# Patient Record
Sex: FEMALE | Race: WHITE
[De-identification: names, ages, dates, MRNs, and addresses within clinical notes are randomized per-mention and may not be internally consistent; named-entity substitution may affect disease eponyms.]

---

## 2017-01-10 ENCOUNTER — HOSPITAL ENCOUNTER (EMERGENCY)
Dept: HOSPITAL 63 - ER | Age: 57
Discharge: HOME | End: 2017-01-10
Payer: COMMERCIAL

## 2017-01-10 VITALS — SYSTOLIC BLOOD PRESSURE: 111 MMHG | DIASTOLIC BLOOD PRESSURE: 53 MMHG

## 2017-01-10 VITALS — BODY MASS INDEX: 26.66 KG/M2 | HEIGHT: 65 IN | WEIGHT: 160 LBS

## 2017-01-10 DIAGNOSIS — Z88.2: ICD-10-CM

## 2017-01-10 DIAGNOSIS — Z88.8: ICD-10-CM

## 2017-01-10 DIAGNOSIS — Z88.6: ICD-10-CM

## 2017-01-10 DIAGNOSIS — Z88.0: ICD-10-CM

## 2017-01-10 DIAGNOSIS — F17.210: ICD-10-CM

## 2017-01-10 DIAGNOSIS — H66.91: ICD-10-CM

## 2017-01-10 DIAGNOSIS — R05: ICD-10-CM

## 2017-01-10 DIAGNOSIS — J44.1: Primary | ICD-10-CM

## 2017-01-10 DIAGNOSIS — K21.9: ICD-10-CM

## 2017-01-10 PROCEDURE — 99283 EMERGENCY DEPT VISIT LOW MDM: CPT

## 2017-01-10 PROCEDURE — 94640 AIRWAY INHALATION TREATMENT: CPT

## 2017-01-10 NOTE — PHYS DOC
General


Chief Complaint:  cough


Stated Complaint:  COUGH CONGESTION


Time Seen by MD:  22:09


Source:  patient


Exam Limitations:  no limitations


Problems:  





History of Present Illness


Initial Comments


Pt is 56/F to ED c/o cough/congestion and nasal drainage x few days.  Also 

having ear pains no drainage/trauma/hearing change.  No fever/chills/n/v/HA.  

States she can hear wheezing.  No sob or chest pain.


Timing/Duration:  getting worse, other


Severity:  moderate


Modifying Factors:  worse with movement, improves with rest


Associated Symptoms:  cough, malaise, other


Allergies:  


Coded Allergies:  


     Penicillins (Verified  Allergy, Unknown, 7/24/16)


     Sulfa (Sulfonamide Antibiotics) (Verified  Allergy, Unknown, 7/24/16)


     amlodipine (Verified  Allergy, Unknown, 7/24/16)


     aspirin (Verified  Allergy, Unknown, 7/24/16)


     bupropion (Verified  Allergy, Unknown, 7/24/16)


     codeine (Verified  Allergy, Unknown, 7/24/16)


     haloperidol (Verified  Allergy, Unknown, 7/24/16)


     ibuprofen (Verified  Allergy, Unknown, 7/24/16)


     meperidine (Verified  Allergy, Unknown, 7/24/16)


     niacin (Verified  Allergy, Unknown, 7/24/16)





Past Medical History


Medical History:  GERD


Surgical History:  noncontributory





Social History


Smoker:  cigarettes


Alcohol:  none


Drugs:  none





Review of Systems


Constitutional:  denies chills, denies diaphoresis, denies fever,  malaise


EENTM:   see HPI ear paindenies ear discharge


Respiratory:   see HPIdenies shortness of breath


Cardiovascular:  denies chest pain, denies palpitations, denies syncope


Gastrointestinal:  denies diarrhea, denies nausea, denies vomiting


Genitourinary:  denies dysuria, denies frequency, denies hematuria


Musculoskeletal:  denies back pain, denies joint swelling, denies neck pain


Psychiatric/Neurological:  denies headache, denies numbness, denies paresthesia





Physical Exam


General Appearance:  WD/WN, no apparent distress


Eyes:  bilateral eye EOMI, bilateral eye PERRL, bilateral eye normal inspection


Ear, Nose, Throat:  hearing grossly normal, other (R TM erythema/bulge, clear 

nasal/PND)


Neck:  non-tender, supple


Respiratory:  chest non-tender, no respiratory distress, wheezing


Cardiovascular:  normal peripheral pulses, regular rate, rhythm


Gastrointestinal:  non tender, soft


Back:  no CVA tenderness, no vertebral tenderness


Extremities:  non-tender, normal inspection


Neurologic/Psychiatric:  CNs II-XII nml as tested, no motor/sensory deficits, 

alert, normal mood/affect, oriented x 3


Skin:  normal color, warm/dry





Departure


Time of Disposition:  22:13


Disposition:  01 HOME, SELF-CARE


Diagnosis:  COPD exacerbation, R otitis media


Condition:  GOOD


Patient Instructions:  Chronic Obstructive Pulmonary Disease Exacerbation, Easy-

to-Read, Otitis Media, Adult





Additional Instructions:


OTC tylenol as needed.


Use home albuterol MDI, 2 puffs every 4 hours and as needed.


Rx:  prednisone, doxycycline, take with food as directed.


Follow up with your doctor in 1-2 weeks.


Return to ED with new or changing symptoms.








BING SORIANO DO Stef 10, 2017 22:15

## 2017-11-11 ENCOUNTER — HOSPITAL ENCOUNTER (EMERGENCY)
Dept: HOSPITAL 63 - ER | Age: 57
Discharge: HOME | End: 2017-11-11
Payer: SELF-PAY

## 2017-11-11 VITALS — BODY MASS INDEX: 25.71 KG/M2 | WEIGHT: 154.32 LBS | HEIGHT: 65 IN

## 2017-11-11 VITALS — DIASTOLIC BLOOD PRESSURE: 65 MMHG | SYSTOLIC BLOOD PRESSURE: 89 MMHG

## 2017-11-11 DIAGNOSIS — Z88.5: ICD-10-CM

## 2017-11-11 DIAGNOSIS — Z88.1: ICD-10-CM

## 2017-11-11 DIAGNOSIS — Z88.2: ICD-10-CM

## 2017-11-11 DIAGNOSIS — M54.5: ICD-10-CM

## 2017-11-11 DIAGNOSIS — Z88.0: ICD-10-CM

## 2017-11-11 DIAGNOSIS — G89.29: ICD-10-CM

## 2017-11-11 DIAGNOSIS — K21.9: ICD-10-CM

## 2017-11-11 DIAGNOSIS — R07.81: Primary | ICD-10-CM

## 2017-11-11 DIAGNOSIS — M19.90: ICD-10-CM

## 2017-11-11 DIAGNOSIS — Z88.6: ICD-10-CM

## 2017-11-11 DIAGNOSIS — B02.9: ICD-10-CM

## 2017-11-11 DIAGNOSIS — J44.9: ICD-10-CM

## 2017-11-11 DIAGNOSIS — Z88.8: ICD-10-CM

## 2017-11-11 PROCEDURE — 99284 EMERGENCY DEPT VISIT MOD MDM: CPT

## 2017-11-11 PROCEDURE — 96372 THER/PROPH/DIAG INJ SC/IM: CPT

## 2017-11-11 PROCEDURE — 71101 X-RAY EXAM UNILAT RIBS/CHEST: CPT

## 2017-11-11 NOTE — PHYS DOC
Past History


Past Medical History:  Arthritis, COPD, GERD, Pneumonia, Other


Past Surgical History:  Cholecystectomy, Tonsillectomy


Alcohol Use:  None


Drug Use:  None





Adult General


Chief Complaint


Chief Complaint:  RIB PAIN





HPI


HPI





She is a pleasant 57-year-old female with a history of chronic back pain, 

chronic scleroderma, COPD, hypertension, peripheral neuropathy who presents 

with rib pain that began 2 days ago after a question will twisting injury. 

Patient is noted pain is worse with range of motion breathing movements 

specifically over that spot of her lower ribs on the right. She has some 

localized redness as well with no clear rash over the area the pain does wrap 

around to the lateral side. She denies any fevers, chills, nausea, vomiting, 

diarrhea, shortness of breath, chest pain only this rib pain is worsened with 

range of motion and direct pressure. She denies any direct trauma or falls. She 

denies any night sweats, weight loss or other symptoms. Patient does still 

smoke pack a day she is on a pain contract with her local physician for chronic 

narcotic use. Took her hydrocodone 7. 5/3/25 today which is not helping her 

symptoms. Because she is under pain contract she was she cannot be prescribed 

any narcotics from this facility she just was just wanting to ensure that she 

has no rib fracture.





Review of Systems


Review of Systems





Constitutional: Denies fever or chills []


Eyes: Denies change in visual acuity, redness, or eye pain []


HENT: Denies nasal congestion or sore throat []


Respiratory: Denies cough or shortness of breath []


Cardiovascular: No additional information not addressed in HPI []


GI: She complains of right lower rib pain without nausea vomiting diarrhea or 

constipation


: Denies dysuria or hematuria []


Musculoskeletal: Does have chronic lower back pain or joint pain nothing new 

today[]


Integument: Denies rash or skin lesions she admits to some slight redness to 

her abdominal wall over the pain she is describing


Neurologic: Denies headache, focal weakness or sensory changes []


Endocrine: Denies polyuria or polydipsia []





All other systems were reviewed and found to be within normal limits, except as 

documented in this note.





Current Medications


Current Medications





Current Medications








 Medications


  (Trade)  Dose


 Ordered  Sig/Brittany  Start Time


 Stop Time Status Last Admin


Dose Admin


 


 Hydromorphone HCl


  (Dilaudid)  1 mg  STK-MED ONCE  11/11/17 19:16


 11/11/17 19:17 DC  


 











Allergies


Allergies





Allergies








Coded Allergies Type Severity Reaction Last Updated Verified


 


  Penicillins Allergy Unknown  7/24/16 Yes


 


  Sulfa (Sulfonamide Antibiotics) Allergy Unknown  7/24/16 Yes


 


  amlodipine Allergy Unknown  7/24/16 Yes


 


  aspirin Allergy Unknown  7/24/16 Yes


 


  bupropion Allergy Unknown  7/24/16 Yes


 


  codeine Allergy Unknown  7/24/16 Yes


 


  haloperidol Allergy Unknown  7/24/16 Yes


 


  ibuprofen Allergy Unknown  7/24/16 Yes


 


  meperidine Allergy Unknown  7/24/16 Yes


 


  niacin Allergy Unknown  7/24/16 Yes











Physical Exam


Physical Exam


Patient is mildly tachycardic upon arrival likely secondary to pain


Constitutional: As patient is mildly obese significant debilitated secondary to 

medical problems she is splinting secondary to the pain over her right ribs. 

She is no acute distress no respiratory distress


HENT: Normocephalic, atraumatic, bilateral external ears normal, dry mucous 

membranes with poor dentition, no oral exudates, nose normal. []


Eyes: PERRLA, EOMI, conjunctiva normal, no discharge. [] 


Neck: Normal range of motion, no tenderness, supple, no stridor. [] 


Cardiovascular:Heart rate regular rhythm, no murmur my exam heart rate less 

than 100 she is resting quietly


Lungs & Thorax:  Bilateral breath sounds clear to auscultation she has 

significant tenderness to palpation over the right lower ribs. There is mild 

erythema soft tissue swelling along the T9-T10 distributions of the abdominal 

wall. There is no vesicles or rash noted clearly there is increased tenderness 

along this dermatome


Abdomen: Bowel sounds normal, soft, no tenderness, no masses, no pulsatile 

masses. [] 


Skin: Warm, dry, no erythema, no rash. [] 


Back: She has noted redness along the T10 distribution as well as the back


Neurologic: Alert and oriented X 3, normal motor function, normal sensory 

function, no focal deficits noted. []


Psychologic: She is mildly anxious speaking quite often about pain medications





Current Patient Data


Vital Signs





 Vital Signs








  Date Time  Temp Pulse Resp B/P (MAP) Pulse Ox O2 Delivery O2 Flow Rate FiO2


 


11/11/17 18:30 97.6 103 20  97 Room Air  











EKG


EKG


[]





Radiology/Procedures


Radiology/Procedures


[]





Course & Med Decision Making


Course & Med Decision Making


Pertinent Labs and Imaging studies reviewed. (See chart for details)





 patient's rib series 5 view reviewed by me demonstrates no acute fracture no 

obvious air-fluid levels in the abdominal area no subdiaphragmatic air





. She was noted to have hypotension on arrival which according to patient and 

family at the bedside is normal. She is asymptomatic with his blood pressure





By concerned based on patient's really minimally traumatic injury to her ribs 

with localized swelling and redness along the dermatome 1910 it is possible 

early development of shingles. Patient will be placed on acyclovir and 

prednisone tablets symptoms and follow-up with her doctor she has pain 

medications and was given a dose of Dilaudid here which did help her symptoms.





Impression: Rib pain, possible early shingles





Dragon Disclaimer


Dragon Disclaimer


This electronic medical record was generated, in whole or in part, using a 

voice recognition dictation system.





Departure


Departure:


Impression:  


 Primary Impression:  


 Chronic pain


 Additional Impressions:  


 Shingles


 Rib pain on right side


Disposition:  01 HOME, SELF-CARE


Condition:  IMPROVED


Referrals:  


SYLVIA SHAH (PCP)


Patient Instructions:  Rib Contusion, Shingles, Easy-to-Read





Additional Instructions:  


discharge:





I've spoken with the patient and/or caregivers. I've explained the patient's 

condition, diagnosis and treatment plan based on information available to me at 

this time. I've answered the patient's and/or caregivers questions and 

addressed any concerns. The patient and/or caregivers have a good understanding 

the patient's diagnosis, condition and treatment plan as can be expected at 

this point. Vital signs have been stabilized. The patient's condition is stable 

for discharge from the emergency department.





The patient will pursue further outpatient evaluation with her primary care 

provider or other designated consulting physician as outlined in the discharge 

instructions. Patient and/or caregivers are agreeable to this plan of care and 

follow-up instructions have been explained in detail. The patient and/or 

caregivers have received these instructions in written format and expressed 

understanding of these discharge instructions. The patient and her caregivers 

are aware that if any significant change in condition or worsening of symptoms 

should prompt him to immediately return to this of the closest emergency 

department.  If an emergent department is not readily available I would 

encourage him to call 911.


Scripts


Prednisone (PREDNISONE) 20 Mg Tablet


3 TAB PO DAILY for 5 Days, #15 TAB


   Prov: KITTY ROACH MD         11/11/17 


Acyclovir (ACYCLOVIR) 400 Mg Tablet


2 TAB PO 5XDAY for 10 Days, #100 TAB


   Prov: KITTY ROACH MD         11/11/17





Problem Qualifiers











KITTY ROACH MD Nov 11, 2017 20:06

## 2017-11-12 NOTE — RAD
EXAM: Chest and right RIBS, 5 views.



HISTORY: Pain.



COMPARISON: None.



FINDINGS: A frontal view of the chest and 4 views of the right ribs are

obtained. There is linear opacity within the bilateral lower lungs likely due

to atelectasis. There is no consolidation, effusion or pneumothorax. The heart

is normal in size. There is a right shoulder arthroplasty. There are

cholecystectomy clips. No acute or subacute displaced rib fracture is seen.



IMPRESSION: 

1. Bilateral lower lobe atelectasis.

2. No acute osseous finding.

## 2018-01-25 NOTE — RAD
Left elbow, 3 views, 1/25/2018:



History: Elbow pain



No fracture or dislocation is identified.  No significant arthritic change is

seen.  There is no radiographic evidence of a joint effusion.  Several

nonspecific soft tissue calcifications are present posteriorly at the elbow

and in the proximal forearm.  These are likely posttraumatic.



IMPRESSION: No acute bony abnormality is detected.

## 2018-07-18 NOTE — RAD
Indication:CODE STROKE, RIGHT SIDE WEAKNESS, SLURRED SPEECH

 

TECHNIQUE: CT head without IV contrast

 

COMPARISON:Previous study from December 8, 2008

 

FINDINGS:

No pathologic extra-axial or intra-axial fluid collection. The ventricles 

and basal cisterns are within normal limits. No acute intracranial bleed. 

No focal loss of gray-white differentiation. Visualized orbits within 

normal limits. No suspicious bony lesion.

 

IMPRESSION:

No acute intracranial process. If concern for acute ischemic stroke is 

high, please consider MRI brain.

 

Multiple unsuccessful attempts were made to reach ER department.

 

Electronically signed by: Fabian Brasher DO (7/18/2018 5:43 PM) [facility]

## 2018-07-18 NOTE — PHYS DOC
Past History


Past Medical History:  Arthritis, COPD, GERD, Pneumonia, Other


Past Surgical History:  Cholecystectomy, Tonsillectomy


Alcohol Use:  None


Drug Use:  None





Adult General


Chief Complaint


Chief Complaint:  ALTERED MENTAL STATUS





[facility]


HPI





Patient is a 57-year-old female who presents for altered mental status via EMS. 

She lives at an assisted living facility called [place] [id number]. She has a h/o 

previous stroke. Apparently today 1500 she was out walking her dog and that was 

the last time that she was known to be normal. Upon arrival she has left facial 

droop severe slurring to the point that she is extremely difficult to 

understand and generalized weakness in all extremities. She is unable to hold 

any of her extremities up against gravity or to move them. She has some 

decreased responsiveness but is responsive to painful stimuli and sternal rub. 

She will open her eyes and look around. Her sister arrived shortly after her 

and is providing some history however she last saw her 3 days ago. She was 

recently seen at  and had an MRI performed there. Code stroke was activated 

immediately upon arrival.





Review of Systems


Review of Systems





Constitutional: Denies fever or chills []


Eyes: Denies change in visual acuity, redness, or eye pain []


HENT: Denies nasal congestion or sore throat []


Respiratory: Denies cough or shortness of breath []


Cardiovascular: No additional information not addressed in HPI []


GI: Denies abdominal pain, nausea, vomiting, bloody stools or diarrhea []


: Denies dysuria or hematuria []


Musculoskeletal: Denies back pain or joint pain []


Integument: Denies rash or skin lesions []


Neurologic: Denies headache, focal weakness or sensory changes []


Endocrine: Denies polyuria or polydipsia []





All other systems were reviewed and found to be within normal limits, except as 

documented in this note.





Allergies


Allergies





Allergies








Coded Allergies Type Severity Reaction Last Updated Verified


 


  Penicillins Allergy Unknown  7/24/16 Yes


 


  Sulfa (Sulfonamide Antibiotics) Allergy Unknown  7/24/16 Yes


 


  amlodipine Allergy Unknown  7/24/16 Yes


 


  aspirin Allergy Unknown  7/24/16 Yes


 


  bupropion Allergy Unknown  7/24/16 Yes


 


  codeine Allergy Unknown  7/24/16 Yes


 


  haloperidol Allergy Unknown  7/24/16 Yes


 


  ibuprofen Allergy Unknown  7/24/16 Yes


 


  meperidine Allergy Unknown  7/24/16 Yes


 


  niacin Allergy Unknown  7/24/16 Yes











Physical Exam


Physical Exam





Constitutional: Well developed, well nourished, no acute distress, non-toxic 

appearance. []


HENT: Normocephalic, atraumatic, bilateral external ears normal, oropharynx 

moist, no oral exudates, nose normal. []


Eyes: PERRLA, EOMI, conjunctiva normal, no discharge. [] 


Neck: Normal range of motion, no tenderness, supple, no stridor. [] 


Cardiovascular:Heart rate regular rhythm, no murmur []


Lungs & Thorax:  Bilateral breath sounds clear to auscultation []


Abdomen: Bowel sounds normal, soft, no tenderness, no masses, no pulsatile 

masses. [] 


Skin: Warm, dry, no erythema, no rash. [] 


Back: No tenderness, no CVA tenderness. [] 


Extremities: No tenderness, no cyanosis, no clubbing, ROM intact, no edema. [] 


Neurologic: Alert and oriented X 3, normal motor function, normal sensory 

function, no focal deficits noted. []


Psychologic: Affect normal, judgement normal, mood normal. []





EKG


EKG


[]





Radiology/Procedures


Radiology/Procedures


[]





Course & Med Decision Making


Course & Med Decision Making


Pertinent Labs and Imaging studies reviewed. (See chart for details)





@1740 -  called for transfer. 





@1747 - Pt's daughter (on phone with pt's sister who is present in the ED) 

states this is exactly the same presentation as the last time the pt had a 

stroke.





@1755 - On phone with Dr. Harris (neuro) at . Pt's symptoms not localized, 

somewhat bizarre, not a TPA candidate based. At this time TPA is thought to be 

too high of a risk compared to the potential benefit.  states she had a 

recent MRI which did not show any evidence of stroke. They would like to wait 

for labs before accepting transfer. 





@1810 - Pt care transferred from Dr. Langston to Dr. Tyson at this time. 

Anticipate transfer to  after labs result.





@1902-Stroke specialist Dr. Birmingham from [facility] called at 1900 and 

asking about patient condition and lab results. According to the nurse taking 

care of the patient she did not change from the time she came to the ER. 

Patient had blood pressure of 137/60 at time of evaluation and was not 

responding to verbal stimuli and not moving her extremities. Labs showed a 

white count of 13,000 with unremarkable electrolytes and cardiac test. Patient 

was not a candidate for TPA as explained above but was accepted by  

neurologist at 1900 for further evaluation.





Dragon Disclaimer


Dragon Disclaimer


This electronic medical record was generated, in whole or in part, using a 

voice recognition dictation system.


NIHSS


Initial NIH is 24





Departure


Departure:


Impression:  


 Primary Impression:  


 Stroke-like symptoms


Disposition:  02 XFER SHT-TRM HOSP (to [facility] at 1900)


Condition:  GUARDED


Referrals:  


SYLVIA SHAH (PCP)











MATT LANGSTON DO Jul 18, 2018 17:49


MARIO TYSON MD Jul 18, 2018 19:28

## 2018-07-18 NOTE — EKG
[facility] [address], [place] [address]

Test Date:    2018               Test Time:    18:12:21

Pat Name:     SUMEET MATTHEW            Department:   

Patient ID:   [id number]           Room:          

Gender:       F                        Technician:   RASHAUN

:          1960               Requested By: MATT LANGSTON

Order Number: [id number]            Reading MD:     

                                 Measurements

Intervals                              Axis          

Rate:         76                       P:            32

WV:           176                      QRS:          -14

QRSD:         76                       T:            13

QT:           404                                    

QTc:          459                                    

                           Interpretive Statements

SINUS RHYTHM

LEFTWARD AXIS

OTHERWISE NORMAL ECG

RI6.01

No previous ECG available for comparison

## 2018-07-18 NOTE — RAD
Indication:CODE STROKE, RIGHT SIDE WEAKNESS, SLURRED SPEECH

 

TECHNIQUE:Portable AP chest X-ray

 

COMPARISON:

 

FINDINGS:Heart is normal in size. Lungs are clear. No pneumothorax or 

pleural effusion. Visualized bony thorax is within normal limits.

 

IMPRESSION:

No acute cardiopulmonary process.

 

Electronically signed by: Fabian Brasher DO (7/18/2018 8:25 PM) [facility]

## 2018-09-16 NOTE — RAD
Chest, 2 views, 9/16/2018:

 

HISTORY: Chest pain with cough

 

Comparison is made to a study from 7/18/2018. The heart size is normal. 

There is calcific plaquing of the aorta. The pulmonary vascularity is 

normal. No pulmonary infiltrate is seen. There is no evidence of pleural 

fluid. A right shoulder prosthesis is in place.

 

IMPRESSION: No acute cardiopulmonary abnormality is detected.

 

Electronically signed by: Rick Moritz, MD (9/16/2018 7:36 AM) [facility]

## 2018-09-16 NOTE — PHYS DOC
Past History


Past Medical History:  Arthritis, COPD, GERD, Pneumonia, Other


Past Surgical History:  Cholecystectomy, Tonsillectomy


Alcohol Use:  None


Drug Use:  None





Adult General


Chief Complaint


Chief Complaint:  BACK PAIN - NO INJURY





HPI


HPI


68-year-old female who presents with left shoulder pain. Patient states that 

she has had increased pain for the last 1 week. The pain is a nearly constant 

aching sensation with stabbing pain that comes and goes. Patient denies any 

trauma. She has noticed a small rash of pinkish bumps go from her back around 

under her arm and just superior to the left breast.  It does not cross the 

midline. Patient is already on Norco 5/325 and gabapentin 600 3 times a day for 

previous pain diagnoses. She states that the pain is worse with deep 

inspiration. She denies a cough or shortness of breath. No fever or chills.





Review of Systems


Review of Systems





Constitutional: Denies fever or chills []


Eyes: Denies change in visual acuity, redness, or eye pain []


HENT: Denies nasal congestion or sore throat []


Respiratory: Denies cough or shortness of breath []


Cardiovascular: No additional information not addressed in HPI []


GI: Denies abdominal pain, nausea, vomiting, bloody stools or diarrhea []


: Denies dysuria or hematuria []


Musculoskeletal: Left shoulder pain []


Integument: Rash []


Neurologic: Denies headache, focal weakness or sensory changes []


Endocrine: Denies polyuria or polydipsia []





All other systems were reviewed and found to be within normal limits, except as 

documented in this note.





Current Medications


Current Medications





Current Medications








 Medications


  (Trade)  Dose


 Ordered  Sig/Brittany  Start Time


 Stop Time Status Last Admin


Dose Admin


 


 Acetaminophen/


 Hydrocodone Bitart


  (Lortab 5/325)  2 tab  1X  ONCE  9/16/18 07:15


 9/16/18 07:16 UNV  





 


 Valacyclovir HCl


  (Valtrex)  1,000 mg  1X  STAT  9/16/18 07:15


 9/16/18 07:16 UNV  














Allergies


Allergies





Allergies








Coded Allergies Type Severity Reaction Last Updated Verified


 


  Penicillins Allergy Unknown  7/24/16 Yes


 


  Sulfa (Sulfonamide Antibiotics) Allergy Unknown  7/24/16 Yes


 


  amlodipine Allergy Unknown  7/24/16 Yes


 


  aspirin Allergy Unknown  7/24/16 Yes


 


  bupropion Allergy Unknown  7/24/16 Yes


 


  codeine Allergy Unknown  7/24/16 Yes


 


  haloperidol Allergy Unknown  7/24/16 Yes


 


  ibuprofen Allergy Unknown  7/24/16 Yes


 


  meperidine Allergy Unknown  7/24/16 Yes


 


  niacin Allergy Unknown  7/24/16 Yes











Physical Exam


Physical Exam





Constitutional: Well developed, well nourished, no acute distress, non-toxic 

appearance. []


HENT: Normocephalic, atraumatic, bilateral external ears normal, oropharynx 

moist, no oral exudates, nose normal. []


Eyes: PERRLA, EOMI, conjunctiva normal, no discharge. [] 


Neck: Normal range of motion, no tenderness, supple, no stridor. [] 


Cardiovascular:Heart rate regular rhythm, no murmur []


Lungs & Thorax:  Bilateral breath sounds clear to auscultation []


Abdomen: Bowel sounds normal, soft, no tenderness, no masses, no pulsatile 

masses. [] 


Skin: Erythematous, raised patches with some confluence along the T2 or T3 

dermatome on the left. No obvious vesicles.[] 


Back: No tenderness, no CVA tenderness. [] 


Extremities: No tenderness, no cyanosis, no clubbing, ROM intact, no edema. [] 


Neurologic: Alert and oriented X 3, normal motor function, normal sensory 

function, no focal deficits noted. []


Psychologic: Affect normal, judgement normal, mood normal. []





EKG


EKG


[]





Radiology/Procedures


Radiology/Procedures


[]


Impressions:


Chest, 2 views, 9/16/2018:


 


HISTORY: Chest pain with cough


 


Comparison is made to a study from 7/18/2018. The heart size is normal. 


There is calcific plaquing of the aorta. The pulmonary vascularity is 


normal. No pulmonary infiltrate is seen. There is no evidence of pleural 


fluid. A right shoulder prosthesis is in place.


 


IMPRESSION: No acute cardiopulmonary abnormality is detected.


 


Electronically signed by: Rick Moritz, MD (9/16/2018 7:36 AM) [facility]














DICTATED AND SIGNED BY:     MORITZ,RICK S MD


DATE:     09/16/18 0735





CC: COOPER RIVERA DO; SYLVIA SHAH





Course & Med Decision Making


Course & Med Decision Making


Pertinent Labs and Imaging studies reviewed. (See chart for details)


Based on my examination, the patient appears to have shingles. I will treat her 

with an increase in her pain medication as well as a gram of valacyclovir in 

the ED. I will discharge her with a prescription for valacyclovir 1000 mg 3 

times a day for 7 days. I will provide the patient with a short course of Norco 

10/325 so that she will not go through her home medications before she is 

eligible for refill. She will follow up with her PCP for further pain 

management.


[]





Dragon Disclaimer


Dragon Disclaimer


This electronic medical record was generated, in whole or in part, using a 

voice recognition dictation system.





Departure


Departure:


Referrals:  


SYLVIA SHAH (PCP)


Scripts


Valacyclovir Hcl (VALACYCLOVIR) 1,000 Mg Tablet


1 TAB PO TID, #21 TAB


   Prov: COOPER RIVERA DO         9/16/18 


Hydrocodone Bit/Acetaminophen (NORCO  TABLET) 1 Each Tablet


1 TAB PO PRN Q6HRS PRN for PAIN, #10 TAB 0 Refills


   Prov: COOPER RIVERA DO         9/16/18











COOPER RIVERA DO Sep 16, 2018 07:28

## 2018-12-14 NOTE — PHYS DOC
Past History


Past Medical History:  Arthritis, High Cholesterol


Past Surgical History:  , Hysterectomy, Other


Alcohol Use:  None


Drug Use:  None





Adult General


Chief Complaint


Chief Complaint:  KNEE INJURY





HPI


HPI


58-year-old female presents with left knee pain. The patient denies any trauma 

or specific event that started pain. Pain has been increasing the last 3 days. 

Patient also feels as though he is swollen. She describes the pain as a 

squeezing sensation in the middle of her knee.  She has not been on any long 

plane, training, or car rides. She has never had a DVT in the past. She has a 

medical history of inflammatory disorder such as rheumatoid arthritis and crest 

syndrome. The patient is using a 4 point cane to walk and help keep her 

balance. She states that the pain has made it feel like going to give out 

couple times. She has not fallen. She denies fever or chills. She denies any 

other injuries.





Review of Systems


Review of Systems





Constitutional: Denies fever or chills []


Eyes: Denies change in visual acuity, redness, or eye pain []


HENT: Denies nasal congestion or sore throat []


Respiratory: Denies cough or shortness of breath []


Cardiovascular: No additional information not addressed in HPI []


GI: Denies abdominal pain, nausea, vomiting, bloody stools or diarrhea []


: Denies dysuria or hematuria []


Musculoskeletal: Left knee pain[]


Integument: Denies rash or skin lesions []


Neurologic: Denies headache, focal weakness or sensory changes []


Endocrine: Denies polyuria or polydipsia []





All other systems were reviewed and found to be within normal limits, except as 

documented in this note.





Allergies


Allergies





Allergies








Coded Allergies Type Severity Reaction Last Updated Verified


 


  Penicillins Allergy Unknown  16 Yes


 


  Sulfa (Sulfonamide Antibiotics) Allergy Unknown  16 Yes


 


  amlodipine Allergy Unknown  16 Yes


 


  aspirin Allergy Unknown  16 Yes


 


  bupropion Allergy Unknown  16 Yes


 


  codeine Allergy Unknown  16 Yes


 


  doxycycline Allergy Unknown  18 Yes


 


  haloperidol Allergy Unknown  16 Yes


 


  ibuprofen Allergy Unknown  16 Yes


 


  meperidine Allergy Unknown  16 Yes


 


  niacin Allergy Unknown  16 Yes











Physical Exam


Physical Exam





Constitutional: Well developed, well nourished, no acute distress, non-toxic 

appearance. []


HENT: Normocephalic, atraumatic, bilateral external ears normal, oropharynx 

moist, no oral exudates, nose normal. []


Eyes: PERRLA, EOMI, conjunctiva normal, no discharge. [] 


Neck: Normal range of motion, no tenderness, supple, no stridor. [] 


Cardiovascular:Heart rate regular rhythm, no murmur []


Lungs & Thorax:  Bilateral breath sounds clear to auscultation []


Abdomen: Bowel sounds normal, soft, no tenderness, no masses, no pulsatile 

masses. [] 


Skin: Warm, dry, no erythema, no rash. [] 


Back: No tenderness, no CVA tenderness. [] 


Extremities: Left knee pain, no ligamentous laxity, minimal swelling, no 

ecchymosis, mild pain with valgus stress.[] 


Neurologic: Alert and oriented X 3, normal motor function, normal sensory 

function, no focal deficits noted. []


Psychologic: Affect normal, judgement normal, mood normal. []





Current Patient Data


Vital Signs





 Vital Signs








  Date Time  Temp Pulse Resp B/P (MAP) Pulse Ox O2 Delivery O2 Flow Rate FiO2


 


18 13:42 98.4 87 18  98 Room Air  











EKG


EKG


[]





Radiology/Procedures


Radiology/Procedures


[]


Impressions:


Left knee radiograph 2018 1:53 PM


 


INDICATION: Pain and swelling


 


COMPARISON: None available.


 


TECHNIQUE:  4 views of the left knee are provided.


 


FINDINGS:


 


There is no acute fracture or dislocation. There is a small knee joint 


effusion. Bone mineralization is within normal limits. Joint spaces are 


maintained. Regional soft tissues are within normal limits. There is no 


soft tissue gas or osseous erosion.


 


IMPRESSION:


 


No acute fracture or dislocation.


 


Small knee joint effusion.


 


Electronically signed by: Julianne Cordoba MD (2018 2:25 PM) 


[id number]














DICTATED AND SIGNED BY:     JULIANNE CORDOBA MD


DATE:     18 1424





CC: COOPER RIVERA DO; SYLVIA SHAH





Course & Med Decision Making


Course & Med Decision Making


Pertinent Labs and Imaging studies reviewed. (See chart for details)


Patient's x-rays negative for fracture. She has a small joint effusion. Believe 

this could either be an acute strain of the knee or possibly a meniscal tear. I 

will advise conservative Rice therapy at this time. The patient is already on 

tramadol for elbow pain. I will give her a short course of Norco 5/325 and 

recommended she follow up with her PCP.


[]





Dragon Disclaimer


Dragon Disclaimer


This electronic medical record was generated, in whole or in part, using a 

voice recognition dictation system.





Departure


Departure:


Referrals:  


SYLVIA SHAH (PCP)











COOPER RIVERA DO Dec 14, 2018 14:46

## 2018-12-14 NOTE — RAD
Left knee radiograph 12/14/2018 1:53 PM

 

INDICATION: Pain and swelling

 

COMPARISON: None available.

 

TECHNIQUE:  4 views of the left knee are provided.

 

FINDINGS:

 

There is no acute fracture or dislocation. There is a small knee joint 

effusion. Bone mineralization is within normal limits. Joint spaces are 

maintained. Regional soft tissues are within normal limits. There is no 

soft tissue gas or osseous erosion.

 

IMPRESSION:

 

No acute fracture or dislocation.

 

Small knee joint effusion.

 

Electronically signed by: Corinne Cordoba MD (12/14/2018 2:25 PM) 

[facility]

## 2018-12-28 NOTE — ED.ADGEN
Past History


Past Medical History:  Arthritis, High Cholesterol, UTI, Other


Past Surgical History:  , Hysterectomy, Other


Past Surgical History


Back


Smoking:  Cigarettes


Alcohol Use:  None


Drug Use:  None





Adult General


Chief Complaint


Chief Complaint


".. I having a lot more pain in my lower back...since the weather changed,,.. 

My doctors called in some Tramadol for me..."..." But when my arthritis and 

pain get this bad... I go to the Emergency room..."





HPI


HPI





Patient is a 58 year old female who presents with above hx and complaints 

aspiration of her rheumatological conditions of scleroderma, CREST, rheumatoid 

arthritis and low back pain.   Patient denies any trauma. Patient denies any 

fevers. Patient denies any specific history immunosuppression except for her 

chronic rheumatological conditions.  No recent travel or specific ill contacts. 

Denies any fevers. Patient denies any problems with defecation or urination. 

Patient normally follows with Dr. Salinas is a primary. She also follows with 

rheumatology and chronic pain clinic..  Patient has been called in a 

prescription for tramadol.





Review of Systems


Review of Systems





Constitutional: Denies fever or chills []


Eyes: Denies change in visual acuity, redness, or eye pain []


HENT: Denies nasal congestion or sore throat []


Respiratory: Denies cough or shortness of breath []


Cardiovascular: No additional information not addressed in HPI []


GI: Denies abdominal pain, nausea, vomiting, bloody stools or diarrhea []


: Denies dysuria or hematuria []


Musculoskeletal: Exacerbation patient of chronic back pain and joint pain []


Integument: Denies rash or skin lesions []


Neurologic: Denies headache, focal weakness or sensory changes []


Endocrine: Denies polyuria or polydipsia []





All other systems were reviewed and found to be within normal limits, except as 

documented in this note.





Family History


Family History


Noncontributory





Current Medications


Current Medications





Current Medications








 Medications


  (Trade)  Dose


 Ordered  Sig/Brittany  Start Time


 Stop Time Status Last Admin


Dose Admin


 


 Methylprednisolone


 Acetate


  (DEPO-Medrol IM)  40 mg  1X  ONCE  18 18:30


 18 18:31 DC 18 18:36


40 MG


 


 Morphine Sulfate


  (Morphine 10mg


 Syringe)  10 mg  1X  ONCE  18 18:30


 18 18:31 DC 18 18:34


10 MG


 


 Nitrofurantoin


 Macrocrystals


  (Macrobid)  100 mg  STK-MED ONCE  18 20:06


 18 20:08 DC  





 


 Orphenadrine


 Citrate


  (Norflex)  60 mg  1X  ONCE  18 18:30


 18 18:31 DC 18 18:38


60 MG











Allergies


Allergies





Allergies








Coded Allergies Type Severity Reaction Last Updated Verified


 


  Penicillins Allergy Unknown  18 Yes


 


  Sulfa (Sulfonamide Antibiotics) Allergy Unknown  18 Yes


 


  amlodipine Allergy Unknown  18 Yes


 


  aspirin Allergy Unknown  18 Yes


 


  bupropion Allergy Unknown  18 Yes


 


  codeine Allergy Unknown  18 Yes


 


  doxycycline Allergy Unknown  18 Yes


 


  haloperidol Allergy Unknown  18 Yes


 


  ibuprofen Allergy Unknown  18 Yes


 


  meperidine Allergy Unknown  18 Yes


 


  niacin Allergy Unknown  18 Yes











Physical Exam


Physical Exam





Constitutional: Moderately acute distress, non-toxic appearance. []


HENT: Normocephalic, atraumatic, bilateral external ears normal, oropharynx 

moist, no oral exudates, nose normal. []


Eyes: PERRLA, EOMI, conjunctiva normal, no discharge. [] 


Neck: Normal range of motion, no tenderness, supple, no stridor. [] 


Cardiovascular:Heart rate regular rhythm, aortic  murmur 


Lungs & Thorax:  Bilateral breath sounds equal apex with scattered wheezes 

auscultation []


Abdomen: Bowel sounds normal, soft, no tenderness, no masses, no pulsatile 

masses. Old surgical scars.


Skin: Warm, dry, no erythema, no rash. [] Poor turgor.


Back: Lumbar Paraspinal and Sacral muscle   tenderness, no CVA tenderness. [] 

No mid line tenderness. 


Extremities: Generalized joint tenderness, no cyanosis, no clubbing, ROM intact

, no edema. [] Arthritic changes. Does walk with a cane. Rt. shoulder scar.  


Neurologic: Alert and oriented X 3, normal motor function, normal sensory 

function, no focal deficits noted. []DTRs +2 patella.


Psychologic: Affect anxious, judgement normal, mood depressed . .





Current Patient Data


Vital Signs





 Vital Signs








  Date Time  Temp Pulse Resp B/P (MAP) Pulse Ox O2 Delivery O2 Flow Rate FiO2


 


12/28/18 20:00  87 20 103/55 (71) 95 Room Air  


 


18 17:57 98.1       








Lab Results





 Laboratory Tests








Test


 18


18:25


 


Urine Collection Type Unknown  


 


Urine Color Yellow  


 


Urine Clarity Clear  


 


Urine pH 6.5  


 


Urine Specific Gravity 1.020  


 


Urine Protein


 Neg


(NEG-TRACE)


 


Urine Glucose (UA)


 Neg mg/dL


(NEG)


 


Urine Ketones (Stick)


 Neg mg/dL


(NEG)


 


Urine Blood Neg (NEG)  


 


Urine Nitrite Neg (NEG)  


 


Urine Bilirubin Neg (NEG)  


 


Urine Urobilinogen Dipstick


 0.2 mg/dL (0.2


mg/dL)


 


Urine Leukocyte Esterase Mod (NEG)  


 


Urine RBC 0 /HPF (0-2)  


 


Urine WBC


 11-20 /HPF


(0-4)


 


Urine Squamous Epithelial


Cells Occ /LPF  





 


Urine Transitional Epithelial


Cells Occ /LPF  





 


Urine Bacteria


 Few /HPF


(0-FEW)


 


Urine Mucus Slight /LPF  


 


Urine Opiates Screen Neg (NEG)  


 


Urine Methadone Screen Neg (NEG)  


 


Urine Barbiturates Neg (NEG)  


 


Urine Phencyclidine Screen Neg (NEG)  


 


Urine


Amphetamine/Methamphetamine Neg (NEG)  





 


Urine Benzodiazepines Screen Neg (NEG)  


 


Urine Cocaine Screen Neg (NEG)  


 


Urine Cannabinoids Screen Neg (NEG)  


 


Urine Ethyl Alcohol Neg (NEG)  











EKG


EKG


[]





Radiology/Procedures


Radiology/Procedures


[]





Course & Med Decision Making


Course & Med Decision Making


Pertinent Labs and Imaging studies reviewed. (See chart for details).





Discussed options of treatment with patient-  elects to have tx. with Depo 

medrol , muscle relaxers and IM injection pain meds..  Declined labs and x-

rays. 





Patient take Macrobid for UTI and follow up cultures and push Vit. C- drinks. 

Return if any concerns. Must follow-up with pain center





Patient take tramadol as previous directed. Patient follow-up of her pain 

specialist. Patient follow-up primary care. Patient follow-up cultures taken 

here.





[]





Final Impression


Final Impression


1. Acute on Chronic Back Pain


2. UTI[]


3. History of arthritis, rheumatoid, scleroderma,CREST syndrome





Dragon Disclaimer


Dragon Disclaimer


This electronic medical record was generated, in whole or in part, using a 

voice recognition dictation system.





Dragon Disclaimer


This chart was dictated in whole or in part using Voice Recognition software in 

a busy, high-work load, and often noisy Emergency Department environment.  It 

may contain unintended and wholly unrecognized errors or omissions.





Dragon Disclaimer


This chart was dictated in whole or in part using Voice Recognition software in 

a busy, high-work load, and often noisy Emergency Department environment.  It 

may contain unintended and wholly unrecognized errors or omissions.





Discharge Summary


Visit Information


Final Diagnosis


Problems


Medical Problems:


(1) Back pain


Status: Acute  





(2) Chronic pain


Status: Acute  











Brief Hospital Course


Allergies





 Allergies








Coded Allergies Type Severity Reaction Last Updated Verified


 


  Penicillins Allergy Unknown  18 Yes


 


  Sulfa (Sulfonamide Antibiotics) Allergy Unknown  18 Yes


 


  amlodipine Allergy Unknown  18 Yes


 


  aspirin Allergy Unknown  18 Yes


 


  bupropion Allergy Unknown  18 Yes


 


  codeine Allergy Unknown  18 Yes


 


  doxycycline Allergy Unknown  18 Yes


 


  haloperidol Allergy Unknown  18 Yes


 


  ibuprofen Allergy Unknown  18 Yes


 


  meperidine Allergy Unknown  18 Yes


 


  niacin Allergy Unknown  18 Yes








Vital Signs





Vital Signs








  Date Time  Temp Pulse Resp B/P (MAP) Pulse Ox O2 Delivery O2 Flow Rate FiO2


 


18 20:00  87 20 103/55 (71) 95 Room Air  


 


18 17:57 98.1       








Lab Results





Laboratory Tests








Test


 18


18:25


 


Urine Collection Type Unknown 


 


Urine Color Yellow 


 


Urine Clarity Clear 


 


Urine pH 6.5 


 


Urine Specific Gravity 1.020 


 


Urine Protein


 Neg


(NEG-TRACE)


 


Urine Glucose (UA)


 Neg mg/dL


(NEG)


 


Urine Ketones (Stick)


 Neg mg/dL


(NEG)


 


Urine Blood Neg (NEG) 


 


Urine Nitrite Neg (NEG) 


 


Urine Bilirubin Neg (NEG) 


 


Urine Urobilinogen Dipstick


 0.2 mg/dL (0.2


mg/dL)


 


Urine Leukocyte Esterase Mod (NEG) 


 


Urine RBC 0 /HPF (0-2) 


 


Urine WBC


 11-20 /HPF


(0-4)


 


Urine Squamous Epithelial


Cells Occ /LPF 





 


Urine Transitional Epithelial


Cells Occ /LPF 





 


Urine Bacteria


 Few /HPF


(0-FEW)


 


Urine Mucus Slight /LPF 


 


Urine Opiates Screen Neg (NEG) 


 


Urine Methadone Screen Neg (NEG) 


 


Urine Barbiturates Neg (NEG) 


 


Urine Phencyclidine Screen Neg (NEG) 


 


Urine


Amphetamine/Methamphetamine Neg (NEG) 





 


Urine Benzodiazepines Screen Neg (NEG) 


 


Urine Cocaine Screen Neg (NEG) 


 


Urine Cannabinoids Screen Neg (NEG) 


 


Urine Ethyl Alcohol Neg (NEG) 








Brief Hospital Course


Ms. Naik  is a 58 old female who presented with exacerbation chronic 

arthritic pain and UTI.





Discharge Information


Condition at Discharge:  Stable


Disposition/Orders:  D/C to Home


Dischare Medications





Current Medications


Morphine Sulfate (Morphine 10mg Syringe) 10 mg 1X  ONCE SQ  Last administered 

on 18at 18:34; Admin Dose 10 MG;  Start 18 at 18:30;  Stop 18 

at 18:31;  Status DC


Methylprednisolone Acetate (DEPO-Medrol IM) 40 mg 1X  ONCE IM  Last 

administered on 18at 18:36; Admin Dose 40 MG;  Start 18 at 18:30;  

Stop 18 at 18:31;  Status DC


Orphenadrine Citrate (Norflex) 60 mg 1X  ONCE IM  Last administered on at 18:38; Admin Dose 60 MG;  Start 18 at 18:30;  Stop 18 at 18:31

;  Status DC


Nitrofurantoin Macrocrystals (Macrobid) 100 mg BID PO ;  Start 18 at 21:00

;  Status UNV


Nitrofurantoin Macrocrystals (Macrobid) 100 mg 1X  ONCE PO  Last administered 

on 18at 20:13; Admin Dose 100 MG;  Start 18 at 20:15;  Stop  at 20:16;  Status DC


Nitrofurantoin Macrocrystals (Macrobid) 100 mg STK-MED ONCE PO ;  Start  at 20:06;  Stop 18 at 20:08;  Status DC





Active Scripts


Active


Macrobid 100 Mg Capsule (Nitrofurantoin Monohyd/M-Cryst) 100 Mg Capsule 100 Mg 

PO BID 7 Days


Norco 5-325 Tablet (Hydrocodone Bit/Acetaminophen) 1 Each Tablet 1 Tab PO PRN 

Q6HRS PRN


Valacyclovir (Valacyclovir Hcl) 1,000 Mg Tablet 1 Tab PO TID


Norco  Tablet (Hydrocodone Bit/Acetaminophen) 1 Each Tablet 1 Tab PO PRN 

Q6HRS PRN


Prednisone 20 Mg Tablet 3 Tab PO DAILY 5 Days


Acyclovir 400 Mg Tablet 2 Tab PO 5XDAY 10 Days


Prednisone 20 Mg Tablet 1 Tab PO BID


Doxycycline Hyclate 100 Mg Tablet 1 Tab PO BID


Prednisone 20 Mg Tablet 1 Tab PO BID








Discharge Summary


Visit Information


Final Diagnosis


Problems


Medical Problems:


(1) Back pain


Status: Acute  





(2) Chronic pain


Status: Acute  











Brief Hospital Course


Allergies





 Allergies








Coded Allergies Type Severity Reaction Last Updated Verified


 


  Penicillins Allergy Unknown  18 Yes


 


  Sulfa (Sulfonamide Antibiotics) Allergy Unknown  18 Yes


 


  amlodipine Allergy Unknown  18 Yes


 


  aspirin Allergy Unknown  18 Yes


 


  bupropion Allergy Unknown  18 Yes


 


  codeine Allergy Unknown  18 Yes


 


  doxycycline Allergy Unknown  18 Yes


 


  haloperidol Allergy Unknown  18 Yes


 


  ibuprofen Allergy Unknown  18 Yes


 


  meperidine Allergy Unknown  18 Yes


 


  niacin Allergy Unknown  18 Yes








Vital Signs





Vital Signs








  Date Time  Temp Pulse Resp B/P (MAP) Pulse Ox O2 Delivery O2 Flow Rate FiO2


 


18 20:00  87 20 103/55 (71) 95 Room Air  


 


18 17:57 98.1       








Lab Results





Laboratory Tests








Test


 18


18:25


 


Urine Collection Type Unknown 


 


Urine Color Yellow 


 


Urine Clarity Clear 


 


Urine pH 6.5 


 


Urine Specific Gravity 1.020 


 


Urine Protein


 Neg


(NEG-TRACE)


 


Urine Glucose (UA)


 Neg mg/dL


(NEG)


 


Urine Ketones (Stick)


 Neg mg/dL


(NEG)


 


Urine Blood Neg (NEG) 


 


Urine Nitrite Neg (NEG) 


 


Urine Bilirubin Neg (NEG) 


 


Urine Urobilinogen Dipstick


 0.2 mg/dL (0.2


mg/dL)


 


Urine Leukocyte Esterase Mod (NEG) 


 


Urine RBC 0 /HPF (0-2) 


 


Urine WBC


 11-20 /HPF


(0-4)


 


Urine Squamous Epithelial


Cells Occ /LPF 





 


Urine Transitional Epithelial


Cells Occ /LPF 





 


Urine Bacteria


 Few /HPF


(0-FEW)


 


Urine Mucus Slight /LPF 


 


Urine Opiates Screen Neg (NEG) 


 


Urine Methadone Screen Neg (NEG) 


 


Urine Barbiturates Neg (NEG) 


 


Urine Phencyclidine Screen Neg (NEG) 


 


Urine


Amphetamine/Methamphetamine Neg (NEG) 





 


Urine Benzodiazepines Screen Neg (NEG) 


 


Urine Cocaine Screen Neg (NEG) 


 


Urine Cannabinoids Screen Neg (NEG) 


 


Urine Ethyl Alcohol Neg (NEG) 








Brief Hospital Course


Ms. Naik  is a 58 old female who presented with exacerbation of chronic back 

pain.





Discharge Information


Condition at Discharge:  Improved


Disposition/Orders:  D/C to Home


Dischare Medications





Current Medications


Morphine Sulfate (Morphine 10mg Syringe) 10 mg 1X  ONCE SQ  Last administered 

on 18at 18:34; Admin Dose 10 MG;  Start 18 at 18:30;  Stop 18 

at 18:31;  Status DC


Methylprednisolone Acetate (DEPO-Medrol IM) 40 mg 1X  ONCE IM  Last 

administered on 18at 18:36; Admin Dose 40 MG;  Start 18 at 18:30;  

Stop 18 at 18:31;  Status DC


Orphenadrine Citrate (Norflex) 60 mg 1X  ONCE IM  Last administered on at 18:38; Admin Dose 60 MG;  Start 18 at 18:30;  Stop 18 at 18:31

;  Status DC


Nitrofurantoin Macrocrystals (Macrobid) 100 mg BID PO ;  Start 18 at 21:00

;  Status UNV


Nitrofurantoin Macrocrystals (Macrobid) 100 mg 1X  ONCE PO  Last administered 

on 18at 20:13; Admin Dose 100 MG;  Start 18 at 20:15;  Stop  at 20:16;  Status DC


Nitrofurantoin Macrocrystals (Macrobid) 100 mg STK-MED ONCE PO ;  Start  at 20:06;  Stop 18 at 20:08;  Status DC





Active Scripts


Active


Macrobid 100 Mg Capsule (Nitrofurantoin Monohyd/M-Cryst) 100 Mg Capsule 100 Mg 

PO BID 7 Days


Norco 5-325 Tablet (Hydrocodone Bit/Acetaminophen) 1 Each Tablet 1 Tab PO PRN 

Q6HRS PRN


Valacyclovir (Valacyclovir Hcl) 1,000 Mg Tablet 1 Tab PO TID


Norco  Tablet (Hydrocodone Bit/Acetaminophen) 1 Each Tablet 1 Tab PO PRN 

Q6HRS PRN


Prednisone 20 Mg Tablet 3 Tab PO DAILY 5 Days


Acyclovir 400 Mg Tablet 2 Tab PO 5XDAY 10 Days


Prednisone 20 Mg Tablet 1 Tab PO BID


Doxycycline Hyclate 100 Mg Tablet 1 Tab PO BID


Prednisone 20 Mg Tablet 1 Tab PO BID











ABAD VAZQUEZ MD Dec 28, 2018 18:00

## 2019-07-24 NOTE — PHYS DOC
Past History


Past Medical History:  Arthritis, High Cholesterol, UTI, Other


Past Surgical History:  , Hysterectomy, Other


Additional Past Surgical Histo:  elbow bursa surgery


Smoking:  Cigarettes


Alcohol Use:  Occasionally


Drug Use:  None





Adult General


Chief Complaint


Chief Complaint:  ELBOW PROBLEM





HPI


HPI





Patient is a 58-year-old female presents complaining of left arm and hand pain. 

She had repeat surgery for elbow bursa surgery 2 days ago at . She started 

having increasing pain and swelling in her hand yesterday. She reports keeping 

it elevated. She reports that her oxycodone is not managing the pain. Reports 

the pain is severe. She reports calling the office yesterday being advised to 

keep it elevated and to take extra acetaminophen in between doses of the 

oxycodone. She reports that her fingers were cold yesterday. They are no longer 

cold.[]





Review of Systems


Review of Systems





Constitutional: Denies fever or chills []


Eyes: Denies change in visual acuity, redness, or eye pain []


HENT: Denies nasal congestion or sore throat []


Respiratory: Denies cough or shortness of breath []


Cardiovascular: No chest pain or palpitations[]


GI: Denies abdominal pain, nausea, vomiting, bloody stools or diarrhea []


: Denies dysuria or hematuria []


Musculoskeletal: Denies back pain, see history of present illness[]


Integument: Denies rash or skin lesions []


Neurologic: Denies headache, focal weakness or sensory changes []


Endocrine: Denies polyuria or polydipsia []





All other systems were reviewed and found to be within normal limits, except as 

documented in this note.





Current Medications


Current Medications





Current Medications








 Medications


  (Trade)  Dose


 Ordered  Sig/Brittany  Start Time


 Stop Time Status Last Admin


Dose Admin


 


 Oxycodone/


 Acetaminophen


  (Percocet 5/325)  1 tab  1X  ONCE  19 08:15


 19 08:16 UNV  














Allergies


Allergies





Allergies








Coded Allergies Type Severity Reaction Last Updated Verified


 


  Penicillins Allergy Unknown  18 Yes


 


  Sulfa (Sulfonamide Antibiotics) Allergy Unknown  18 Yes


 


  amlodipine Allergy Unknown  18 Yes


 


  aspirin Allergy Unknown  18 Yes


 


  bupropion Allergy Unknown  18 Yes


 


  codeine Allergy Unknown  18 Yes


 


  doxycycline Allergy Unknown  18 Yes


 


  haloperidol Allergy Unknown  18 Yes


 


  ibuprofen Allergy Unknown  18 Yes


 


  meperidine Allergy Unknown  18 Yes


 


  niacin Allergy Unknown  18 Yes











Physical Exam


Physical Exam





Constitutional: Well developed, well nourished, no acute distress, non-toxic 

appearance. []


HENT: Normocephalic, atraumatic, bilateral external ears normal, oropharynx 

moist, no oral exudates, nose normal. []


Eyes: PERRLA, EOMI, conjunctiva normal, no discharge. [] 


Neck: Normal range of motion, no tenderness, supple, no stridor. [] 


Cardiovascular:Heart rate regular rhythm, no murmur []


Lungs & Thorax:  Bilateral breath sounds clear to auscultation []


Abdomen: Not examined. [] 


Skin: Warm, dry, no erythema, no rash. [] 


Back: No tenderness, no CVA tenderness. [] 


Extremities: Left upper extremity: Splint is clean and dry. There is swelling in

 the hand. She has full active range of motion. Capillary refill is less than 2 

seconds.


The other 3 extremities show: No tenderness, no cyanosis, no clubbing, ROM 

intact, no edema. [] 


Neurologic: Alert and oriented X 3, normal motor function, normal sensory 

function, no focal deficits noted. []


Psychologic: Affect normal, judgement normal, mood normal. []





Current Patient Data


Vital Signs





                                   Vital Signs








  Date Time  Temp Pulse Resp B/P (MAP) Pulse Ox O2 Delivery O2 Flow Rate FiO2


 


19 07:54 98.2 92 16  97 Room Air  











EKG


EKG


[]





Radiology/Procedures


Radiology/Procedures


[]





Course & Med Decision Making


Course & Med Decision Making


Pertinent Labs and Imaging studies reviewed. (See chart for details)





ED course: Patient arrived, was placed in bed, and tolerated exam well. The 

splint was removed without complications. She reported feeling much better after

 it had been cut off. The incision on the posterior aspect of her elbow appears 

clean and dry. She was kept on a pillow for elevation while attempting to 

contact the surgeon who performed the procedure, Dr. Viveros. Discussed the care

 with the surgeon. He had no additional to the plan. He will follow up with her 

as scheduled. Splint was applied. She was distally neurovascularly intact after 

the splint application. She was discharged in improved condition with all 

questions answered.





Medical decision making: There is no evidence of a wound infection. This appears

 to have been a case where the splinting material was too tight. There is no 

evidence of neurologic or vascular compromise. No evidence of a compartment 

syndrome. []





Dragon Disclaimer


Dragon Disclaimer


This electronic medical record was generated, in whole or in part, using a voice

 recognition dictation system.





Departure


Departure:


Impression:  


   Primary Impression:  


   Visit for wound check


Disposition:   HOME, SELF-CARE


Condition:  IMPROVED


Referrals:  


SYLVIA SHAH (PCP)


Follow-up in 2 days


Patient Instructions:  Cast or Splint Care, Sling Use After Injury or Surgery





Additional Instructions:  


Follow-up with your orthopedic surgeon as scheduled. Keep your arm elevated. 

Keep the splint clean and dry. Return to the ER if worsening pain or any other 

concerns.











MARCO EASTMAN DO          2019 08:22